# Patient Record
Sex: MALE | Race: WHITE | NOT HISPANIC OR LATINO | Employment: FULL TIME | URBAN - METROPOLITAN AREA
[De-identification: names, ages, dates, MRNs, and addresses within clinical notes are randomized per-mention and may not be internally consistent; named-entity substitution may affect disease eponyms.]

---

## 2024-05-16 ENCOUNTER — OFFICE VISIT (OUTPATIENT)
Dept: URGENT CARE | Facility: CLINIC | Age: 29
End: 2024-05-16
Payer: COMMERCIAL

## 2024-05-16 VITALS
BODY MASS INDEX: 35.04 KG/M2 | OXYGEN SATURATION: 95 % | HEIGHT: 72 IN | DIASTOLIC BLOOD PRESSURE: 77 MMHG | HEART RATE: 74 BPM | RESPIRATION RATE: 19 BRPM | WEIGHT: 258.69 LBS | TEMPERATURE: 98 F | SYSTOLIC BLOOD PRESSURE: 131 MMHG

## 2024-05-16 DIAGNOSIS — L08.9 INFECTED CYST OF SKIN: Primary | ICD-10-CM

## 2024-05-16 DIAGNOSIS — L72.9 INFECTED CYST OF SKIN: Primary | ICD-10-CM

## 2024-05-16 DIAGNOSIS — Z76.89 ENCOUNTER FOR INCISION AND DRAINAGE PROCEDURE: ICD-10-CM

## 2024-05-16 PROCEDURE — 99499 UNLISTED E&M SERVICE: CPT | Mod: S$GLB,,, | Performed by: NURSE PRACTITIONER

## 2024-05-16 PROCEDURE — 10060 I&D ABSCESS SIMPLE/SINGLE: CPT | Mod: S$GLB,,, | Performed by: NURSE PRACTITIONER

## 2024-05-16 RX ORDER — MUPIROCIN 20 MG/G
OINTMENT TOPICAL 3 TIMES DAILY
Qty: 30 G | Refills: 0 | Status: SHIPPED | OUTPATIENT
Start: 2024-05-16

## 2024-05-16 RX ORDER — SULFAMETHOXAZOLE AND TRIMETHOPRIM 800; 160 MG/1; MG/1
1 TABLET ORAL 2 TIMES DAILY
Qty: 20 TABLET | Refills: 0 | Status: SHIPPED | OUTPATIENT
Start: 2024-05-16 | End: 2024-05-26

## 2024-05-16 RX ORDER — BUDESONIDE AND FORMOTEROL FUMARATE DIHYDRATE 160; 4.5 UG/1; UG/1
2 AEROSOL RESPIRATORY (INHALATION) EVERY 12 HOURS
COMMUNITY

## 2024-05-16 RX ORDER — CETIRIZINE HYDROCHLORIDE 10 MG/1
10 TABLET ORAL DAILY
COMMUNITY

## 2024-05-16 RX ORDER — ALBUTEROL SULFATE 5 MG/ML
2.5 SOLUTION RESPIRATORY (INHALATION) EVERY 6 HOURS PRN
COMMUNITY

## 2024-05-16 RX ORDER — MUPIROCIN 20 MG/G
OINTMENT TOPICAL
Status: COMPLETED | OUTPATIENT
Start: 2024-05-16 | End: 2024-05-16

## 2024-05-16 RX ADMIN — MUPIROCIN: 20 OINTMENT TOPICAL at 03:05

## 2024-05-16 NOTE — PROGRESS NOTES
Subjective:      Patient ID: Zachary Leong is a 28 y.o. male.    Vitals:  height is 6' (1.829 m) and weight is 117.4 kg (258 lb 11.4 oz). His oral temperature is 97.6 °F (36.4 °C). His blood pressure is 131/77 and his pulse is 74. His respiration is 19 and oxygen saturation is 95%.     Chief Complaint: Abscess    28 year old male presents for evaluation of cyst x 4 years that became red and started draining this morning. Reports foul smelling discharge. No medications taken for relief. Has consulted with PCP in the past who told him this was nothing to be concerned about    Abscess  Chronicity:  NewProgression Since Onset: worsening  Location:  Torso  Associated Symptoms: no fever, no chills, no sweats  Characteristics: draining      Constitution: Negative. Negative for chills, sweating, fatigue and fever.   HENT: Negative.     Neck: neck negative.   Cardiovascular: Negative.    Eyes: Negative.    Respiratory: Negative.     Gastrointestinal: Negative.    Endocrine: negative.   Genitourinary: Negative.    Musculoskeletal: Negative.    Skin:  Positive for erythema and abscess.   Allergic/Immunologic: Negative.    Neurological: Negative.    Hematologic/Lymphatic: Negative.    Psychiatric/Behavioral: Negative.        Objective:     Physical Exam   Constitutional: He is oriented to person, place, and time. He is cooperative.  Non-toxic appearance. He does not appear ill. No distress. obesityawake  HENT:   Head: Normocephalic and atraumatic.   Ears:   Right Ear: Hearing normal.   Left Ear: Hearing normal.   Eyes: Conjunctivae are normal. Pupils are equal, round, and reactive to light. Right eye exhibits no discharge. Left eye exhibits no discharge. Extraocular movement intact   Neck: Neck supple.   Cardiovascular: Normal rate.   Pulmonary/Chest: Effort normal.   Abdominal: Normal appearance.   Musculoskeletal: Normal range of motion.         General: Normal range of motion.   Neurological: no focal deficit. He is alert  and oriented to person, place, and time.   Skin: Skin is warm, dry, not diaphoretic, not pale, no rash, not urticarial, not nodular, not pustular, not purpuric, not macular, not vesicular, not papular, not maculopapular and abscessed (2cm infected cyst to right side). erythema No bruising jaundice  Psychiatric: His behavior is normal. Mood, judgment and thought content normal.   Nursing note and vitals reviewed.      Assessment:     1. Infected cyst of skin    2. Encounter for incision and drainage procedure        Plan:       Infected cyst of skin  -     sulfamethoxazole-trimethoprim 800-160mg (BACTRIM DS) 800-160 mg Tab; Take 1 tablet by mouth 2 (two) times daily. for 10 days  Dispense: 20 tablet; Refill: 0  -     mupirocin (BACTROBAN) 2 % ointment; Apply topically 3 (three) times daily.  Dispense: 30 g; Refill: 0  -     mupirocin 2 % ointment  -     Cancel: CULTURE, AEROBIC  (SPECIFY SOURCE)  -     Cancel: CULTURE, ANAEROBE  -     Incision & Drainage    Encounter for incision and drainage procedure      Patient presents with infected cyst. White foul-smelling sebum noted with palpation. Decision to perform I&D. Plan is to treat bacterial infection, prevent further infection/ worsening, and follow up as needed. Discussed with patient who verbalizes understanding.         Patient Instructions   Keep area clean and dry  Wash with antibacterial soap and water   Apply Mupirocin ointment as directed  Complete Bactrim course and directed  Follow up as needed  Follow up with Primary Care Provider

## 2024-05-16 NOTE — PATIENT INSTRUCTIONS
Keep area clean and dry  Wash with antibacterial soap and water   Apply Mupirocin ointment as directed  Complete Bactrim course and directed  Follow up as needed  Follow up with Primary Care Provider

## 2024-05-16 NOTE — PROCEDURES
"Incision & Drainage    Date/Time: 5/16/2024 3:00 PM    Performed by: Naresh Mohan NP  Authorized by: Naresh Mohan NP    Time out: Immediately prior to procedure a "time out" was called to verify the correct patient, procedure, equipment, support staff and site/side marked as required.    Consent Done?:  Yes (Verbal)    Type:  Cyst  Body area:  Trunk  Location details:  Chest  Anesthesia:  Local infiltration  Local anesthetic: Lidocaine 1% without epinephrine  Anesthetic total (ml):  3  Scalpel size:  11  Incision type:  Single straight  Incision depth: dermal    Complexity:  Simple  Drainage amount:  Moderate  Wound treatment:  Incision, drainage, expression of material, wound left open and sebum removal  Patient tolerance:  Patient tolerated the procedure well with no immediate complications  Pain Assessment: 0    "